# Patient Record
Sex: FEMALE | Race: WHITE | Employment: OTHER
[De-identification: names, ages, dates, MRNs, and addresses within clinical notes are randomized per-mention and may not be internally consistent; named-entity substitution may affect disease eponyms.]

---

## 2017-08-05 ENCOUNTER — HEALTH MAINTENANCE LETTER (OUTPATIENT)
Age: 42
End: 2017-08-05

## 2018-09-21 ENCOUNTER — APPOINTMENT (OUTPATIENT)
Dept: GENERAL RADIOLOGY | Facility: CLINIC | Age: 43
End: 2018-09-21
Attending: EMERGENCY MEDICINE

## 2018-09-21 ENCOUNTER — HOSPITAL ENCOUNTER (EMERGENCY)
Facility: CLINIC | Age: 43
Discharge: HOME | End: 2018-09-21
Attending: EMERGENCY MEDICINE | Admitting: EMERGENCY MEDICINE

## 2018-09-21 VITALS
DIASTOLIC BLOOD PRESSURE: 69 MMHG | TEMPERATURE: 96.9 F | BODY MASS INDEX: 31.25 KG/M2 | RESPIRATION RATE: 14 BRPM | OXYGEN SATURATION: 98 % | HEART RATE: 70 BPM | WEIGHT: 160 LBS | SYSTOLIC BLOOD PRESSURE: 120 MMHG

## 2018-09-21 DIAGNOSIS — S93.402A SPRAIN OF LEFT ANKLE, UNSPECIFIED LIGAMENT, INITIAL ENCOUNTER: ICD-10-CM

## 2018-09-21 PROCEDURE — 73610 X-RAY EXAM OF ANKLE: CPT | Mod: LT

## 2018-09-21 PROCEDURE — 99283 EMERGENCY DEPT VISIT LOW MDM: CPT | Performed by: EMERGENCY MEDICINE

## 2018-09-21 PROCEDURE — 99284 EMERGENCY DEPT VISIT MOD MDM: CPT | Mod: Z6 | Performed by: EMERGENCY MEDICINE

## 2018-09-21 RX ORDER — IBUPROFEN 600 MG/1
600 TABLET, FILM COATED ORAL EVERY 8 HOURS PRN
Qty: 20 TABLET | Refills: 0 | Status: SHIPPED | OUTPATIENT
Start: 2018-09-21

## 2018-09-21 ASSESSMENT — ENCOUNTER SYMPTOMS
ARTHRALGIAS: 1
JOINT SWELLING: 1

## 2018-09-21 NOTE — ED AVS SNAPSHOT
[facility], [place], Emergency Department    [address] [place] [address]    Phone:  [phone]    Fax:  [phone]                                       Mary Padron   MRN: [id number]    Department:  [facility], Emergency Department   Date of Visit:  9/21/2018           After Visit Summary Signature Page     I have received my discharge instructions, and my questions have been answered. I have discussed any challenges I see with this plan with the nurse or doctor.    ..........................................................................................................................................  Patient/Patient Representative Signature      ..........................................................................................................................................  Patient Representative Print Name and Relationship to Patient    ..................................................               ................................................  Date                                   Time    ..........................................................................................................................................  Reviewed by Signature/Title    ...................................................              ..............................................  Date                                               Time          22EPIC Rev 08/18

## 2018-09-21 NOTE — ED AVS SNAPSHOT
[facility], Emergency Department    [address]    [place] [address]    Phone:  [phone]    Fax:  [phone]                                       Mary Padron   MRN: [id number]    Department:  [facility], Emergency Department   Date of Visit:  2018           Patient Information     Date Of Birth          1975        Your diagnoses for this visit were:     Sprain of left ankle, unspecified ligament, initial encounter        You were seen by Douglas Young MD.        Discharge Instructions          Qué es un esguince de tobillo?    El tobillo es la articulación en la que se unen la pierna y el pie. Los huesos están sujetados por bandas de tejido conectivo llamadas ligamentos. Cuando los ligamentos del tobillo se estiran hasta el punto de lesionarse y causar dolor, se trata de un esguince de tobillo. Un esguince puede desgarrar los ligamentos. Esos desgarros pueden ser muy pequeños, guille aun así causar dolor. Los esguinces de tobillo pueden ser leves o graves.   Cuáles son las causas de un esguince de tobillo?  Un esguince puede ocurrir si se tuerce el tobillo o lo dobla demasiado. [place] puede pasarle si tropieza o se . Las cosas que podrían aumentar las probabilidades de tener un esguince de tobillo incluyen:    Bianka tenido un esguince de tobillo antes    Practicar deportes que impliquen correr y saltar, o deportes de contacto tales lilia el fútbol o el hockey    Usar calzado que no brinde buen soporte a lea pies y lea tobillos    Tener tobillos débiles y poco flexibles  Síntomas de un esguince de tobillo  Los síntomas pueden incluir lo siguiente:    Dolor o sensibilidad en el tobillo    Hinchazón    Enrojecimiento o moretones    No poder caminar o cargar peso sobre el pie afectado    Darrin amplitud de movimiento en el tobillo    Sensación de desgarro o rotura en el momento en que se produce el esguince    El tobillo se ve anormal o dislocado    Falta de estabilidad o  demasiada amplitud de movimiento en el tobillo  Tratamiento de un esguince de tobillo  El tratamiento se centra en reducir el dolor y la inflamación, y evitar zoë lesión mayor. Los tratamientos pueden incluir lo siguiente:    Hacer reposo del tobillo. Evite cargar peso sobre el tobillo. [place] puede significar usar muletas hasta tanto el esguince se sane.    Medicamentos analgésicos (calmantes del dolor) recetados o de venta melany. Ayudan a reducir la inflamación y el dolor.    Compresas frías. Ayudan a reducir el dolor y la inflamación.    Elevar el tobillo por encima del nivel de gallegos corazón. [place] ayuda a reducir la hinchazón.    Envolver el tobillo con zoë venda elástica o zoë tobillera. [place] ayuda a reducir la inflamación y mine algo de soporte al tobillo. En casos raros, puede necesitar un yeso o zoë bota ortopédica.    Estiramiento y otros ejercicios. Mejoran la flexibilidad y la fuerza.    Compresas de calor. Es posible que le recomienden usarlas antes de hacer ejercicios con los tobillos.  Posibles complicaciones de un esguince de tobillo  Si el tobillo ya está debilitado por allen tenido un esguince, tiene más probabilidades de tener otros esguinces en el futuro. Hacer ejercicios para fortalecer gallegos tobillo y mejorar gallegos equilibrio puede reducir gallegos riesgo de tener futuros esguinces. Otras posibles complicaciones son dolor a chasity plazo (crónico) o que el tobillo quede inestable.  Cuándo llamar a gallegos proveedor de atención médica  Llame a gallegos proveedor de atención médica de inmediato si nota alguno de los siguientes síntomas:    Fiebre de 100.4  F (38  C) o superior, o según le indiquen    Dolor, entumecimiento, decoloración o frío en el pie o los dedos del pie    Dolor que empeora    Los síntomas no mejoran, o empeoran    Síntomas nuevos   Date Last Reviewed: 3/10/2016    2611-1513 [employer]. [address] [place] [address]. Todos los derechos reservados. Esta información no pretende  sustituir la atención médica profesional. Sólo gallegos médico puede diagnosticar y tratar un problema de rafiq.          24 Hour Appointment Hotline       To make an appointment at any [facility] clinic, call [phone] ([phone]). If you don't have a family doctor or clinic, we will help you find one. [facility] clinics are conveniently located to serve the needs of you and your family.          ED Discharge Orders     [facility]                    Review of your medicines      CONTINUE these medicines which may have CHANGED, or have new prescriptions. If we are uncertain of the size of tablets/capsules you have at home, strength may be listed as something that might have changed.        Dose / Directions Last dose taken    * ibuprofen 400-800 mg tablet   Commonly known as:  ADVIL,MOTRIN   Dose:  400-800 mg   What changed:  Another medication with the same name was added. Make sure you understand how and when to take each.   Quantity:  90 tablet        Take 1-2 tablets (400-800 mg) by mouth every 6 hours as needed (cramping)   Refills:  0        * ibuprofen 600 MG tablet   Commonly known as:  ADVIL/MOTRIN   Dose:  600 mg   What changed:  You were already taking a medication with the same name, and this prescription was added. Make sure you understand how and when to take each.   Quantity:  20 tablet        Take 1 tablet (600 mg) by mouth every 8 hours as needed for moderate pain   Refills:  0        * Notice:  This list has 2 medication(s) that are the same as other medications prescribed for you. Read the directions carefully, and ask your doctor or other care provider to review them with you.      Our records show that you are taking the medicines listed below. If these are incorrect, please call your family doctor or clinic.        Dose / Directions Last dose taken    FUTURO RESTORING DRESS SOCKS Misc        Refills:  0        IRON (FERROUS GLUCONATE) PO        Refills:  0        oxyCODONE-acetaminophen  5-325 MG per tablet   Commonly known as:  PERCOCET   Dose:  1-2 tablet   Quantity:  40 tablet        Take 1-2 tablets by mouth every 4 hours as needed for pain   Refills:  0        PRENATAL VITAMINS PO   Dose:  1 tablet        Take 1 tablet by mouth daily   Refills:  0        senna-docusate 8.6-50 MG per tablet   Commonly known as:  SENOKOT-S;PERICOLACE   Dose:  1-2 tablet   Quantity:  60 tablet        Take 1-2 tablets by mouth 2 times daily as needed for constipation   Refills:  2                Prescriptions were sent or printed at these locations (1 Prescription)                   Other Prescriptions                Printed at Department/Unit printer (1 of 1)         ibuprofen (ADVIL/MOTRIN) 600 MG tablet                Procedures and tests performed during your visit     XR Ankle Left 3 Views      Orders Needing Specimen Collection     None      Pending Results     No orders found from 9/19/2018 to 9/22/2018.            Pending Culture Results     No orders found from 9/19/2018 to 9/22/2018.            Pending Results Instructions     If you had any lab results that were not finalized at the time of your Discharge, you can call the ED Lab Result RN at [phone]. You will be contacted by this team for any positive Lab results or changes in treatment. The nurses are available 7 days a week from 10A to 6:30P.  You can leave a message 24 hours per day and they will return your call.        Thank you for choosing [facility]       Thank you for choosing [facility] for your care. Our goal is always to provide you with excellent care. Hearing back from our patients is one way we can continue to improve our services. Please take a few minutes to complete the written survey that you may receive in the mail after you visit with us. Thank you!        [employer] Information     [employer] lets you send messages to your doctor, view your test results, renew your prescriptions, schedule appointments and more. To sign up, go to  [email or web address] . Click on \"Log in\" on the left side of the screen, which will take you to the Welcome page. Then click on \"Sign up Now\" on the right side of the page.     You will be asked to enter the access code listed below, as well as some personal information. Please follow the directions to create your username and password.     Your access code is: [email or web address]  Expires: 2018  6:55 PM     Your access code will  in 90 days. If you need help or a new code, please call your [facility] clinic or [phone].        Care EveryWhere ID     This is your Care EveryWhere ID. This could be used by other organizations to access your [facility] medical records  [id number]        Equal Access to Services     MELIZA LECHUGA : Jeremiah Cerna, dony ledezma, porfirio clancy, robin adler . So [facility] [phone].    ATENCIÓN: Si habla español, tiene a gallegos disposición servicios gratuitos de asistencia lingüística. Llame al [phone].    We comply with applicable federal civil rights laws and [place] laws. We do not discriminate on the basis of race, color, national origin, age, disability, sex, sexual orientation, or gender identity.            After Visit Summary       This is your record. Keep this with you and show to your community pharmacist(s) and doctor(s) at your next visit.                  "

## 2018-09-21 NOTE — ED PROVIDER NOTES
"  History     Chief Complaint   Patient presents with     Ankle Pain     Onset today this am, \"I was walking and did not see the hole,\" twisted left ankle and now has swelling and pain.     HPI  Mary Padron is a 43 year old female who twisted her left ankle when she stepped into a hole.  She has lateral malleolar swelling she is able to ambulate no other pain or injuries    I have reviewed the Medications, Allergies, Past Medical and Surgical History, and Social History in the [employer] system.  Past Medical History:   Diagnosis Date     Anemia      NO ACTIVE PROBLEMS      Social History     Social History     Marital status: Significant other     Spouse name: N/A     Number of children: N/A     Years of education: N/A     Occupational History     Not on file.     Social History Main Topics     Smoking status: Never Smoker     Smokeless tobacco: Never Used     Alcohol use No     Drug use: No     Sexual activity: Yes     Partners: Male     Other Topics Concern     Not on file     Social History Narrative       Review of Systems   Musculoskeletal: Positive for arthralgias and joint swelling.   All other systems reviewed and are negative.      Physical Exam   BP: 124/67  Pulse: 60  Heart Rate: 60  Temp: 96.9  F (36.1  C)  Resp: 16  Weight: 72.6 kg (160 lb)  SpO2: 98 %      Physical Exam   Constitutional: She is oriented to person, place, and time. She appears well-developed and well-nourished. No distress.   Musculoskeletal:        Left ankle: She exhibits swelling.        Feet:    Neurological: She is alert and oriented to person, place, and time.   Nursing note and vitals reviewed.      ED Course     ED Course     Procedures        Results for orders placed or performed during the hospital encounter of 09/21/18   XR Ankle Left 3 Views    Narrative    XR ANKLE LT G/E 3 VW 9/21/2018 6:43 PM    HISTORY: Pain.    COMPARISON: None.      Impression    IMPRESSION: No evidence of acute fracture or malalignment. Ankle  mortise is " intact. Soft tissue swelling overlying the lateral  malleolus.     CHERRY RUST MD            Labs Ordered and Resulted from Time of ED Arrival Up to the Time of Departure from the ED - No data to display         Assessments & Plan (with Medical Decision Making)   Acute left ankle sprain manifest as lateral malleolar tenderness.  No proximal fibular tenderness no base of the fifth metatarsal.  Ace wrap gel splint and ibuprofen weightbearing as tolerated.    I have reviewed the nursing notes.    I have reviewed the findings, diagnosis, plan and need for follow up with the patient.    New Prescriptions    IBUPROFEN (ADVIL/MOTRIN) 600 MG TABLET    Take 1 tablet (600 mg) by mouth every 8 hours as needed for moderate pain       Final diagnoses:   Sprain of left ankle, unspecified ligament, initial encounter       9/21/2018   [facility], [place], EMERGENCY DEPARTMENT     Douglas Young MD  09/21/18 9582

## 2018-09-21 NOTE — DISCHARGE INSTRUCTIONS
Qué es un esguince de tobillo?    El tobillo es la articulación en la que se unen la pierna y el pie. Los huesos están sujetados por bandas de tejido conectivo llamadas ligamentos. Cuando los ligamentos del tobillo se estiran hasta el punto de lesionarse y causar dolor, se trata de un esguince de tobillo. Un esguince puede desgarrar los ligamentos. Esos desgarros pueden ser muy pequeños, guille aun así causar dolor. Los esguinces de tobillo pueden ser leves o graves.   Cuáles son las causas de un esguince de tobillo?  Un esguince puede ocurrir si se tuerce el tobillo o lo dobla demasiado. [place] puede pasarle si tropieza o se . Las cosas que podrían aumentar las probabilidades de tener un esguince de tobillo incluyen:    Bianka tenido un esguince de tobillo antes    Practicar deportes que impliquen correr y saltar, o deportes de contacto tales lilia el fútbol o el hockey    Usar calzado que no brinde buen soporte a lea pies y lea tobillos    Tener tobillos débiles y poco flexibles  Síntomas de un esguince de tobillo  Los síntomas pueden incluir lo siguiente:    Dolor o sensibilidad en el tobillo    Hinchazón    Enrojecimiento o moretones    No poder caminar o cargar peso sobre el pie afectado    [place] amplitud de movimiento en el tobillo    Sensación de desgarro o rotura en el momento en que se produce el esguince    El tobillo se ve anormal o dislocado    Falta de estabilidad o demasiada amplitud de movimiento en el tobillo  Tratamiento de un esguince de tobillo  El tratamiento se centra en reducir el dolor y la inflamación, y evitar zoë lesión mayor. Los tratamientos pueden incluir lo siguiente:    Hacer reposo del tobillo. Evite cargar peso sobre el tobillo. [place] puede significar usar muletas hasta tanto el esguince se sane.    Medicamentos analgésicos (calmantes del dolor) recetados o de venta melany. Ayudan a reducir la inflamación y el dolor.    Compresas frías. Ayudan a reducir el dolor y la  inflamación.    Elevar el tobillo por encima del nivel de gallegos corazón. [place] ayuda a reducir la hinchazón.    Envolver el tobillo con zoë venda elástica o zoë tobillera. [place] ayuda a reducir la inflamación y mine algo de soporte al tobillo. En casos raros, puede necesitar un yeso o zoë bota ortopédica.    Estiramiento y otros ejercicios. Mejoran la flexibilidad y la fuerza.    Compresas de calor. Es posible que le recomienden usarlas antes de hacer ejercicios con los tobillos.  Posibles complicaciones de un esguince de tobillo  Si el tobillo ya está debilitado por allen tenido un esguince, tiene más probabilidades de tener otros esguinces en el futuro. Hacer ejercicios para fortalecer gallegos tobillo y mejorar gallegos equilibrio puede reducir gallegos riesgo de tener futuros esguinces. Otras posibles complicaciones son dolor a chasity plazo (crónico) o que el tobillo quede inestable.  Cuándo llamar a gallegos proveedor de atención médica  Llame a gallegos proveedor de atención médica de inmediato si nota alguno de los siguientes síntomas:    Fiebre de 100.4  F (38  C) o superior, o según le indiquen    Dolor, entumecimiento, decoloración o frío en el pie o los dedos del pie    Dolor que empeora    Los síntomas no mejoran, o empeoran    Síntomas nuevos   Date Last Reviewed: 3/10/2016    [id number] [employer]. [address] [place] [address]. Todos los derechos reservados. Esta información no pretende sustituir la atención médica profesional. Sólo gallegos médico puede diagnosticar y tratar un problema de rafiq.

## 2019-10-11 ENCOUNTER — OFFICE VISIT (OUTPATIENT)
Dept: MIDWIFE SERVICES | Facility: CLINIC | Age: 44
End: 2019-10-11

## 2019-10-11 VITALS
HEART RATE: 75 BPM | BODY MASS INDEX: 29.88 KG/M2 | DIASTOLIC BLOOD PRESSURE: 78 MMHG | TEMPERATURE: 98.6 F | SYSTOLIC BLOOD PRESSURE: 116 MMHG | WEIGHT: 153 LBS

## 2019-10-11 DIAGNOSIS — N76.0 BACTERIAL VAGINITIS: ICD-10-CM

## 2019-10-11 DIAGNOSIS — N89.8 VAGINAL DISCHARGE: Primary | ICD-10-CM

## 2019-10-11 DIAGNOSIS — B37.31 YEAST INFECTION OF THE VAGINA: ICD-10-CM

## 2019-10-11 DIAGNOSIS — B96.89 BACTERIAL VAGINITIS: ICD-10-CM

## 2019-10-11 LAB
SPECIMEN SOURCE: ABNORMAL
WET PREP SPEC: ABNORMAL

## 2019-10-11 PROCEDURE — 87210 SMEAR WET MOUNT SALINE/INK: CPT | Performed by: ADVANCED PRACTICE MIDWIFE

## 2019-10-11 PROCEDURE — T1013 SIGN LANG/ORAL INTERPRETER: HCPCS | Mod: U3 | Performed by: ADVANCED PRACTICE MIDWIFE

## 2019-10-11 PROCEDURE — 99202 OFFICE O/P NEW SF 15 MIN: CPT | Performed by: ADVANCED PRACTICE MIDWIFE

## 2019-10-11 RX ORDER — METRONIDAZOLE 500 MG/1
500 TABLET ORAL 2 TIMES DAILY
Qty: 14 TABLET | Refills: 1 | Status: SHIPPED | OUTPATIENT
Start: 2019-10-11 | End: 2019-10-18

## 2019-10-11 RX ORDER — FLUCONAZOLE 150 MG/1
150 TABLET ORAL
Qty: 4 TABLET | Refills: 1 | Status: SHIPPED | OUTPATIENT
Start: 2019-10-11

## 2019-10-11 NOTE — PROGRESS NOTES
SUBJECTIVE: Mary Padron is a 44 year old  female presents with abnormal vaginal discharge   for 10 days.LMP:  10/6/19  General medical, surgical, OB/Gyn and social histories   reviewed and updated in Histories section of [facility].       CC:  here, pt states has had this infection before  States 10 days of vagininal irritation, itching and burning.  LMP 10/6/19   has had vasectomy.    Vaginal symptoms: discharge described as white and yellow, local irritation, vulvar itching and burning.  Vulvar symptoms: vulvar itching and burning.  Discharge described as: white and yellow.  Other associated symptoms: none.  Menstrual pattern: She had been bleeding regularly.    OBJECTIVE:  Patient appears well, vital signs normal. Abdomen normal, soft   without tenderness, guarding, mass or organomegaly. No inguinal   adenopathy or CVA tenderness.    Pelvic Exam:External genitalia and vagina normal. Bimanual and rectovaginal normal.  Cultures obtained: none indicated.  Wet prep: + clue cells     ASSESSMENT:   bacterial vaginosis.  (N89.8) Vaginal discharge  (primary encounter diagnosis)  Comment:   Plan: Wet prep            (N76.0,  B96.89) Bacterial vaginitis  Comment:   Plan: metroNIDAZOLE (FLAGYL) 500 MG tablet            (B37.3) Yeast infection of the vagina    Plan: fluconazole (DIFLUCAN) 150 MG tablet             PLAN:  Treatment plan per orders in [facility]. STD prevention discussed.   Abstain from intercourse for duration of treatment. Return if   symptoms do not resolve as anticipated.  Patient has had yeast last week and tends to get both bacterial vaginosis and yeast.  Will treat Metronidazole 500 mg twice per day for 7 days sent to preferred pharmacy.  Patient has been instructed to abstain from ETOH and sexual intercourse during the course of this treatment.  After this course is completed will start diflucan 1 tablet every 3 days until 4 tablets are gone.     here and  states understanding.   Pt wanted printed prescriptions.  Liliana Palmer CNM

## 2019-10-11 NOTE — NURSING NOTE
Chief Complaint   Patient presents with     Vaginal Problem       Initial /78 (BP Location: Left arm, Patient Position: Sitting, Cuff Size: Adult Regular)   Pulse 75   Temp 98.6  F (37  C) (Oral)   Wt 69.4 kg (153 lb)   BMI 29.88 kg/m   Estimated body mass index is 29.88 kg/m  as calculated from the following:    Height as of 13: 1.524 m (5').    Weight as of this encounter: 69.4 kg (153 lb).  BP completed using cuff size: regular    Questioned patient about current smoking habits.  Pt. has never smoked.          The following HM Due: pap smear      The following patient reported/Care Every where data was sent to:  P ABSTRACT QUALITY INITIATIVES [70120]  BARRETT Huff MA

## 2020-03-15 ENCOUNTER — HOSPITAL ENCOUNTER (EMERGENCY)
Facility: CLINIC | Age: 45
Discharge: HOME | End: 2020-03-16
Attending: EMERGENCY MEDICINE | Admitting: EMERGENCY MEDICINE

## 2020-03-15 DIAGNOSIS — R10.84 ABDOMINAL PAIN, GENERALIZED: ICD-10-CM

## 2020-03-15 DIAGNOSIS — R19.7 DIARRHEA, UNSPECIFIED TYPE: ICD-10-CM

## 2020-03-15 LAB
ALBUMIN SERPL-MCNC: 3.6 G/DL (ref 3.4–5)
ALBUMIN UR-MCNC: 10 MG/DL
ALP SERPL-CCNC: 106 U/L (ref 40–150)
ALT SERPL W P-5'-P-CCNC: 34 U/L (ref 0–50)
ANION GAP SERPL CALCULATED.3IONS-SCNC: 6 MMOL/L (ref 3–14)
APPEARANCE UR: CLEAR
AST SERPL W P-5'-P-CCNC: 22 U/L (ref 0–45)
BASOPHILS # BLD AUTO: 0 10E9/L (ref 0–0.2)
BASOPHILS NFR BLD AUTO: 0.1 %
BILIRUB SERPL-MCNC: 0.4 MG/DL (ref 0.2–1.3)
BILIRUB UR QL STRIP: NEGATIVE
BUN SERPL-MCNC: 15 MG/DL (ref 7–30)
CALCIUM SERPL-MCNC: 8.2 MG/DL (ref 8.5–10.1)
CHLORIDE SERPL-SCNC: 112 MMOL/L (ref 94–109)
CO2 SERPL-SCNC: 23 MMOL/L (ref 20–32)
COLOR UR AUTO: YELLOW
CREAT SERPL-MCNC: 0.66 MG/DL (ref 0.52–1.04)
DIFFERENTIAL METHOD BLD: NORMAL
EOSINOPHIL # BLD AUTO: 0.1 10E9/L (ref 0–0.7)
EOSINOPHIL NFR BLD AUTO: 0.7 %
ERYTHROCYTE [DISTWIDTH] IN BLOOD BY AUTOMATED COUNT: 14.8 % (ref 10–15)
GFR SERPL CREATININE-BSD FRML MDRD: >90 ML/MIN/{1.73_M2}
GLUCOSE SERPL-MCNC: 103 MG/DL (ref 70–99)
GLUCOSE UR STRIP-MCNC: NEGATIVE MG/DL
HCG UR QL: NEGATIVE
HCT VFR BLD AUTO: 38.3 % (ref 35–47)
HGB BLD-MCNC: 12.4 G/DL (ref 11.7–15.7)
HGB UR QL STRIP: ABNORMAL
IMM GRANULOCYTES # BLD: 0 10E9/L (ref 0–0.4)
IMM GRANULOCYTES NFR BLD: 0.3 %
KETONES UR STRIP-MCNC: NEGATIVE MG/DL
LEUKOCYTE ESTERASE UR QL STRIP: NEGATIVE
LIPASE SERPL-CCNC: 122 U/L (ref 73–393)
LYMPHOCYTES # BLD AUTO: 2 10E9/L (ref 0.8–5.3)
LYMPHOCYTES NFR BLD AUTO: 21.9 %
MCH RBC QN AUTO: 26.7 PG (ref 26.5–33)
MCHC RBC AUTO-ENTMCNC: 32.4 G/DL (ref 31.5–36.5)
MCV RBC AUTO: 82 FL (ref 78–100)
MONOCYTES # BLD AUTO: 0.4 10E9/L (ref 0–1.3)
MONOCYTES NFR BLD AUTO: 4 %
MUCOUS THREADS #/AREA URNS LPF: PRESENT /LPF
NEUTROPHILS # BLD AUTO: 6.6 10E9/L (ref 1.6–8.3)
NEUTROPHILS NFR BLD AUTO: 73 %
NITRATE UR QL: NEGATIVE
NRBC # BLD AUTO: 0 10*3/UL
NRBC BLD AUTO-RTO: 0 /100
PH UR STRIP: 6 PH (ref 5–7)
PLATELET # BLD AUTO: 272 10E9/L (ref 150–450)
POTASSIUM SERPL-SCNC: 3.3 MMOL/L (ref 3.4–5.3)
PROT SERPL-MCNC: 7.8 G/DL (ref 6.8–8.8)
RBC # BLD AUTO: 4.65 10E12/L (ref 3.8–5.2)
RBC #/AREA URNS AUTO: 6 /HPF (ref 0–2)
SODIUM SERPL-SCNC: 141 MMOL/L (ref 133–144)
SOURCE: ABNORMAL
SP GR UR STRIP: 1.03 (ref 1–1.03)
SQUAMOUS #/AREA URNS AUTO: 10 /HPF (ref 0–1)
UROBILINOGEN UR STRIP-MCNC: NORMAL MG/DL (ref 0–2)
WBC # BLD AUTO: 9 10E9/L (ref 4–11)
WBC #/AREA URNS AUTO: 4 /HPF (ref 0–5)

## 2020-03-15 PROCEDURE — 96374 THER/PROPH/DIAG INJ IV PUSH: CPT | Mod: 59 | Performed by: EMERGENCY MEDICINE

## 2020-03-15 PROCEDURE — 85025 COMPLETE CBC W/AUTO DIFF WBC: CPT | Performed by: EMERGENCY MEDICINE

## 2020-03-15 PROCEDURE — 80053 COMPREHEN METABOLIC PANEL: CPT | Performed by: EMERGENCY MEDICINE

## 2020-03-15 PROCEDURE — 96361 HYDRATE IV INFUSION ADD-ON: CPT | Performed by: EMERGENCY MEDICINE

## 2020-03-15 PROCEDURE — 83690 ASSAY OF LIPASE: CPT | Performed by: EMERGENCY MEDICINE

## 2020-03-15 PROCEDURE — 81025 URINE PREGNANCY TEST: CPT | Performed by: EMERGENCY MEDICINE

## 2020-03-15 PROCEDURE — 25000128 H RX IP 250 OP 636: Performed by: EMERGENCY MEDICINE

## 2020-03-15 PROCEDURE — 99284 EMERGENCY DEPT VISIT MOD MDM: CPT | Mod: Z6 | Performed by: EMERGENCY MEDICINE

## 2020-03-15 PROCEDURE — 81001 URINALYSIS AUTO W/SCOPE: CPT | Performed by: EMERGENCY MEDICINE

## 2020-03-15 PROCEDURE — 25800030 ZZH RX IP 258 OP 636: Performed by: EMERGENCY MEDICINE

## 2020-03-15 PROCEDURE — 99285 EMERGENCY DEPT VISIT HI MDM: CPT | Mod: 25 | Performed by: EMERGENCY MEDICINE

## 2020-03-15 RX ORDER — POTASSIUM CHLORIDE 20MEQ/15ML
40 LIQUID (ML) ORAL ONCE
Status: COMPLETED | OUTPATIENT
Start: 2020-03-15 | End: 2020-03-16

## 2020-03-15 RX ORDER — SODIUM CHLORIDE 9 MG/ML
INJECTION, SOLUTION INTRAVENOUS CONTINUOUS
Status: DISCONTINUED | OUTPATIENT
Start: 2020-03-15 | End: 2020-03-16 | Stop reason: HOSPADM

## 2020-03-15 RX ORDER — HYDROMORPHONE HYDROCHLORIDE 1 MG/ML
0.5 INJECTION, SOLUTION INTRAMUSCULAR; INTRAVENOUS; SUBCUTANEOUS
Status: DISCONTINUED | OUTPATIENT
Start: 2020-03-15 | End: 2020-03-16 | Stop reason: HOSPADM

## 2020-03-15 RX ADMIN — SODIUM CHLORIDE 1000 ML: 9 INJECTION, SOLUTION INTRAVENOUS at 23:06

## 2020-03-15 RX ADMIN — HYDROMORPHONE HYDROCHLORIDE 0.5 MG: 1 INJECTION, SOLUTION INTRAMUSCULAR; INTRAVENOUS; SUBCUTANEOUS at 23:08

## 2020-03-15 ASSESSMENT — ENCOUNTER SYMPTOMS
FEVER: 0
BLOOD IN STOOL: 0
DIARRHEA: 1
ABDOMINAL PAIN: 1

## 2020-03-15 NOTE — ED AVS SNAPSHOT
[facility], [place], Emergency Department  [address]  [place] [address]  Phone:  [phone]  Fax:  [phone]                                    Mary Padron   MRN: [id number]    Department:  [facility], Emergency Department   Date of Visit:  3/15/2020           After Visit Summary Signature Page    I have received my discharge instructions, and my questions have been answered. I have discussed any challenges I see with this plan with the nurse or doctor.    ..........................................................................................................................................  Patient/Patient Representative Signature      ..........................................................................................................................................  Patient Representative Print Name and Relationship to Patient    ..................................................               ................................................  Date                                   Time    ..........................................................................................................................................  Reviewed by Signature/Title    ...................................................              ..............................................  Date                                               Time          22EPIC Rev 08/18

## 2020-03-16 ENCOUNTER — APPOINTMENT (OUTPATIENT)
Dept: CT IMAGING | Facility: CLINIC | Age: 45
End: 2020-03-16
Attending: EMERGENCY MEDICINE

## 2020-03-16 VITALS
TEMPERATURE: 98.2 F | BODY MASS INDEX: 30.86 KG/M2 | RESPIRATION RATE: 16 BRPM | WEIGHT: 158 LBS | SYSTOLIC BLOOD PRESSURE: 96 MMHG | OXYGEN SATURATION: 99 % | DIASTOLIC BLOOD PRESSURE: 63 MMHG | HEART RATE: 72 BPM

## 2020-03-16 PROCEDURE — 25000132 ZZH RX MED GY IP 250 OP 250 PS 637: Performed by: EMERGENCY MEDICINE

## 2020-03-16 PROCEDURE — 25000128 H RX IP 250 OP 636: Performed by: EMERGENCY MEDICINE

## 2020-03-16 PROCEDURE — 96361 HYDRATE IV INFUSION ADD-ON: CPT | Performed by: EMERGENCY MEDICINE

## 2020-03-16 PROCEDURE — 25000125 ZZHC RX 250: Performed by: EMERGENCY MEDICINE

## 2020-03-16 PROCEDURE — 25800030 ZZH RX IP 258 OP 636: Performed by: EMERGENCY MEDICINE

## 2020-03-16 PROCEDURE — 96376 TX/PRO/DX INJ SAME DRUG ADON: CPT | Performed by: EMERGENCY MEDICINE

## 2020-03-16 PROCEDURE — 74177 CT ABD & PELVIS W/CONTRAST: CPT

## 2020-03-16 RX ORDER — IOPAMIDOL 755 MG/ML
100 INJECTION, SOLUTION INTRAVASCULAR ONCE
Status: COMPLETED | OUTPATIENT
Start: 2020-03-16 | End: 2020-03-16

## 2020-03-16 RX ADMIN — POTASSIUM CHLORIDE 40 MEQ: 1.5 SOLUTION ORAL at 00:00

## 2020-03-16 RX ADMIN — SODIUM CHLORIDE 1000 ML: 9 INJECTION, SOLUTION INTRAVENOUS at 00:03

## 2020-03-16 RX ADMIN — SODIUM CHLORIDE: 9 INJECTION, SOLUTION INTRAVENOUS at 01:13

## 2020-03-16 RX ADMIN — HYDROMORPHONE HYDROCHLORIDE 0.5 MG: 1 INJECTION, SOLUTION INTRAMUSCULAR; INTRAVENOUS; SUBCUTANEOUS at 02:07

## 2020-03-16 RX ADMIN — IOPAMIDOL 78 ML: 755 INJECTION, SOLUTION INTRAVENOUS at 01:31

## 2020-03-16 RX ADMIN — SODIUM CHLORIDE 59 ML: 9 INJECTION, SOLUTION INTRAVENOUS at 01:36

## 2020-03-16 NOTE — ED NOTES
Patient feeling better but not able to provide a stool specimen for lab evaluation. Advised to follow up at clinic to continue evaluation and to return here if persistent symptoms.     Qasim Cifuentes MD  03/16/20 [id number]

## 2020-03-16 NOTE — DISCHARGE INSTRUCTIONS
Drink plenty of fluids  Follow up at clinic for stool tests   Make an appointment to follow-up with your primary care provider this week.  If you are not doing well including fevers blood in the stool or other problems return to the Emergency Department

## 2020-03-16 NOTE — ED NOTES
Patient presents to ED with lower abdominal pain and diarrhea; patient states symptoms started yesterday and increased today; up ambulated to BR and voided; spouse at bedside; patient states she drank and tolerated pedialyte yesterday and took pepto bismal with minimal relief.

## 2020-03-16 NOTE — ED PROVIDER NOTES
[facility] EMERGENCY DEPARTMENT ([facility] [place])     March 15, 2020    ED Provider Note  [facility]      History     Chief Complaint   Patient presents with     Abdominal Pain     ONSET yesterday, mid to lower ABDOMEN, PAIN MUCH worse today; denies N & V     Diarrhea     onset yesterday, worse today     HPI  Mary Padron is a 44 year old female who presents to the ED for evaluation of abdominal pain and diarrhea. Patient reports her symptoms started 2 days ago. She states as of this afternoon she is having episodes of diarrhea every 5 minutes. Patient reports she has been taking Pepto-Bismol. She denies blood in her stool. She notes her stools have been black. She denies fever. She denies ill contacts with similar symptoms. She denies possibility of pregnancy. Patient states she has had a  in the past, and denies any other previous abdominal surgeries.     Past Medical History  Past Medical History:   Diagnosis Date     Anemia      NO ACTIVE PROBLEMS      Past Surgical History:   Procedure Laterality Date      SECTION      x2      SECTION  2013    Procedure:  SECTION;  Repeat  Section  *Latex Safe*;  Surgeon: Milena Anguiano MD;  Location: [facility] L+D     Elastic Bandages & Supports (FUTURO RESTORING DRESS SOCKS) MISC  fluconazole (DIFLUCAN) 150 MG tablet  ibuprofen (ADVIL,MOTRIN) 400-800 mg tablet  ibuprofen (ADVIL/MOTRIN) 600 MG tablet  IRON, FERROUS GLUCONATE, PO  oxyCODONE-acetaminophen (PERCOCET) 5-325 MG per tablet  PRENATAL VITAMINS PO  senna-docusate (SENOKOT-S;PERICOLACE) 8.6-50 MG per tablet      No Known Allergies  Past medical history, past surgical history, medications, and allergies were reviewed with the patient. Additional pertinent items: None    Family History  Family History   Problem Relation Age of Onset     Family History Negative Unknown      Family history was reviewed with the patient. Additional pertinent  items: None    Social History  Social History     Tobacco Use     Smoking status: Never Smoker     Smokeless tobacco: Never Used   Substance Use Topics     Alcohol use: No     Drug use: No      Social history was reviewed with the patient. Additional pertinent items: None    Review of Systems   Constitutional: Negative for fever.   Respiratory: Negative.    Gastrointestinal: Positive for abdominal pain, diarrhea and nausea. Negative for blood in stool and vomiting.   Genitourinary: Negative.    Musculoskeletal: Negative.    All other systems reviewed and are negative.    A complete review of systems was performed with pertinent positives and negatives noted in the HPI, and all other systems negative.    Physical Exam   BP: 134/77  Pulse: 72  Temp: 98.2  F (36.8  C)  Resp: 20  Weight: 71.7 kg (158 lb)  SpO2: 100 %  Physical Exam  Nursing note reviewed.   Constitutional:       General: She is in acute distress.   Cardiovascular:      Rate and Rhythm: Normal rate and regular rhythm.      Heart sounds: Normal heart sounds.   Pulmonary:      Effort: Pulmonary effort is normal.      Breath sounds: Normal breath sounds.   Abdominal:      Tenderness: There is generalized abdominal tenderness.      Hernia: No hernia is present.   Skin:     General: Skin is warm.   Neurological:      Mental Status: She is alert.         ED Course      Procedures        Medications   0.9% sodium chloride BOLUS (0 mLs Intravenous Stopped 3/16/20 0006)     Followed by   0.9% sodium chloride BOLUS (0 mLs Intravenous Stopped 3/16/20 0113)   potassium chloride (KAYCIEL) solution 40 mEq (40 mEq Oral Given 3/16/20 0000)   iopamidol (ISOVUE-370) solution 100 mL (78 mLs Intravenous Given 3/16/20 0131)   sodium chloride 0.9 % bag 500mL for CT scan flush use (59 mLs Intravenous Given 3/16/20 0136)          Results for orders placed or performed during the hospital encounter of 03/15/20   CT Abdomen Pelvis w Contrast     Status: None    Narrative    EXAM:  CT ABDOMEN PELVIS W CONTRAST  LOCATION: [facility]  DATE/TIME: 3/16/2020 1:12 AM    INDICATION: Abdominal pain, acute, generalized.  COMPARISON: None.  TECHNIQUE: CT scan of the abdomen and pelvis was performed following injection of IV contrast. Multiplanar reformats were obtained. Dose reduction techniques were used.  CONTRAST: 78 ml Iso 370.    FINDINGS:   LOWER CHEST: Moderate air trapping and atelectasis without definite infiltrate. No effusion.    HEPATOBILIARY: High density in the gallbladder could represent sludge or poorly calcified stones. No adjacent inflammatory change. Liver normal.    PANCREAS: Normal.    SPLEEN: Normal.    ADRENAL GLANDS: Normal.    KIDNEYS/BLADDER: Two adjacent nonobstructing 2 mm stones upper pole right kidney. Nonobstructing 3 mm stone mid left kidney and 2 mm stone upper left kidney. No hydronephrosis.    BOWEL: Normal.    LYMPH NODES: Normal.    VASCULATURE: Unremarkable.    PELVIC ORGANS: Small bilateral fat-containing inguinal hernias. Tiny fat-containing paraumbilical hernia.    MUSCULOSKELETAL: Degenerative disease.      Impression    IMPRESSION:   1.  Nonobstructive bilateral nephrolithiasis.  2.  High density in the gallbladder could represent sludge or poorly calcified stones. If further evaluation is warranted ultrasound recommended.  3.  Small bilateral fat-containing inguinal hernias and fat-containing paraumbilical hernia without bowel obstruction.  4.  Moderate bibasilar air trapping most often associated with small vessel or small airways disease.     CBC with platelets differential     Status: None   Result Value Ref Range    WBC 9.0 4.0 - 11.0 10e9/L    RBC Count 4.65 3.8 - 5.2 10e12/L    Hemoglobin 12.4 11.7 - 15.7 g/dL    Hematocrit 38.3 35.0 - 47.0 %    MCV 82 78 - 100 fl    MCH 26.7 26.5 - 33.0 pg    MCHC 32.4 31.5 - 36.5 g/dL    RDW 14.8 10.0 - 15.0 %    Platelet Count 272 150 - 450 10e9/L    Diff Method Automated Method     % Neutrophils 73.0 %     % Lymphocytes 21.9 %    % Monocytes 4.0 %    % Eosinophils 0.7 %    % Basophils 0.1 %    % Immature Granulocytes 0.3 %    Nucleated RBCs 0 0 /100    Absolute Neutrophil 6.6 1.6 - 8.3 10e9/L    Absolute Lymphocytes 2.0 0.8 - 5.3 10e9/L    Absolute Monocytes 0.4 0.0 - 1.3 10e9/L    Absolute Eosinophils 0.1 0.0 - 0.7 10e9/L    Absolute Basophils 0.0 0.0 - 0.2 10e9/L    Abs Immature Granulocytes 0.0 0 - 0.4 10e9/L    Absolute Nucleated RBC 0.0    Comprehensive metabolic panel     Status: Abnormal   Result Value Ref Range    Sodium 141 133 - 144 mmol/L    Potassium 3.3 (L) 3.4 - 5.3 mmol/L    Chloride 112 (H) 94 - 109 mmol/L    Carbon Dioxide 23 20 - 32 mmol/L    Anion Gap 6 3 - 14 mmol/L    Glucose 103 (H) 70 - 99 mg/dL    Urea Nitrogen 15 7 - 30 mg/dL    Creatinine 0.66 0.52 - 1.04 mg/dL    GFR Estimate >90 >60 mL/min/[1.73_m2]    GFR Estimate If Black >90 >60 mL/min/[1.73_m2]    Calcium 8.2 (L) 8.5 - 10.1 mg/dL    Bilirubin Total 0.4 0.2 - 1.3 mg/dL    Albumin 3.6 3.4 - 5.0 g/dL    Protein Total 7.8 6.8 - 8.8 g/dL    Alkaline Phosphatase 106 40 - 150 U/L    ALT 34 0 - 50 U/L    AST 22 0 - 45 U/L   Lipase     Status: None   Result Value Ref Range    Lipase 122 73 - 393 U/L   UA with Microscopic     Status: Abnormal   Result Value Ref Range    Color Urine Yellow     Appearance Urine Clear     Glucose Urine Negative NEG^Negative mg/dL    Bilirubin Urine Negative NEG^Negative    Ketones Urine Negative NEG^Negative mg/dL    Specific Gravity Urine 1.033 1.003 - 1.035    Blood Urine Small (A) NEG^Negative    pH Urine 6.0 5.0 - 7.0 pH    Protein Albumin Urine 10 (A) NEG^Negative mg/dL    Urobilinogen mg/dL Normal 0.0 - 2.0 mg/dL    Nitrite Urine Negative NEG^Negative    Leukocyte Esterase Urine Negative NEG^Negative    Source Midstream Urine     WBC Urine 4 0 - 5 /HPF    RBC Urine 6 (H) 0 - 2 /HPF    Squamous Epithelial /HPF Urine 10 (H) 0 - 1 /HPF    Mucous Urine Present (A) NEG^Negative /LPF   HCG qualitative urine (UPT)      Status: None   Result Value Ref Range    HCG Qual Urine Negative NEG^Negative     Medications   0.9% sodium chloride BOLUS (0 mLs Intravenous Stopped 3/16/20 0006)     Followed by   0.9% sodium chloride BOLUS (0 mLs Intravenous Stopped 3/16/20 0113)   potassium chloride (KAYCIEL) solution 40 mEq (40 mEq Oral Given 3/16/20 0000)   iopamidol (ISOVUE-370) solution 100 mL (78 mLs Intravenous Given 3/16/20 0131)   sodium chloride 0.9 % bag 500mL for CT scan flush use (59 mLs Intravenous Given 3/16/20 0136)        Assessments & Plan (with Medical Decision Making)   This is a 44-year-old female presents with acute diarrheal illness and diffuse abdominal pain.  She has not had vomiting no fevers no blood in the stool no mucus.  I have ordered stool for C. difficile and enteric pathogens.  She is receiving IV fluids and will obtain CT of the abdomen to rule out colitis or other intra-abdominal cause for her symptoms.  The patient is signed out to the next physician pending results of the CT of the abdomen.  If the CT is benign she can be discharged as long as she is taking p.o.    I have reviewed the nursing notes. I have reviewed the findings, diagnosis, plan and need for follow up with the patient.    Discharge Medication List as of 3/16/2020  4:14 AM          Final diagnoses:   Diarrhea, unspecified type   Abdominal pain, generalized     I, Ellen Gamboa, am serving as a trained medical scribe to document services personally performed by Douglas Young MD, based on the provider's statements to me.      I, Douglas Young MD, was physically present and have reviewed and verified the accuracy of this note documented by Ellen Gamboa.     --  Douglas Young MD   Emergency Medicine   [facility], [place], EMERGENCY DEPARTMENT  3/15/2020     Douglas Young MD  03/17/20 [id number]

## 2020-03-17 ASSESSMENT — ENCOUNTER SYMPTOMS
RESPIRATORY NEGATIVE: 1
NAUSEA: 1
MUSCULOSKELETAL NEGATIVE: 1
VOMITING: 0